# Patient Record
Sex: FEMALE | Race: BLACK OR AFRICAN AMERICAN | NOT HISPANIC OR LATINO | URBAN - METROPOLITAN AREA
[De-identification: names, ages, dates, MRNs, and addresses within clinical notes are randomized per-mention and may not be internally consistent; named-entity substitution may affect disease eponyms.]

---

## 2017-01-27 ENCOUNTER — IMPORTED ENCOUNTER (OUTPATIENT)
Dept: URBAN - METROPOLITAN AREA CLINIC 1 | Facility: CLINIC | Age: 52
End: 2017-01-27

## 2017-01-27 PROBLEM — Z96.1: Noted: 2017-01-27

## 2017-01-27 PROBLEM — H25.12: Noted: 2017-01-27

## 2017-01-27 PROBLEM — H40.013: Noted: 2017-01-27

## 2017-01-27 PROBLEM — H20.811: Noted: 2017-01-27

## 2017-01-27 PROCEDURE — 92133 CPTRZD OPH DX IMG PST SGM ON: CPT

## 2017-01-27 PROCEDURE — 92014 COMPRE OPH EXAM EST PT 1/>: CPT

## 2017-01-27 NOTE — PATIENT DISCUSSION
1.  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 2.  Glaucoma Suspect OU (0.2 OU) OCT shows no progression. Secondary to Fuch's Heterochromitis Iridocyclitis OD: C/D and IOP stable OU Past w/u (-). 3.  SHANEKA w/ PEK OU- Cont ATs TID OU routinely 4. Cataract OS: Observe for now without intervention. The patient was advised to contact us if any change or worsening of vision5. Pseudophakia OD- stable. Letter to PCP Return for an appointment in 6 mo 10 with Dr. Sivakumar Olivas.

## 2017-04-12 NOTE — PATIENT DISCUSSION
(H35.036) Drusen (degenerative) of macula, bilateral - Assesment : Examination revealed Drusen OU. - Plan : Monitor for changes. Advised patient to call our office with decreased vision or increased symptoms.

## 2017-04-12 NOTE — PATIENT DISCUSSION
(H35.371) Puckering of macula, right eye - Assesment : Examination revealed ERM OD - mild - Plan : Monitor. Advised patient to call our office with decreased vision or increased symptoms.

## 2017-04-12 NOTE — PATIENT DISCUSSION
(H25.013) Cortical age-related cataract, bilateral - Assesment : Examination revealed Cortical Senile Cataract. Patient is currently asymptomatic and functioning well. - Plan : Monitor for changes. Advised patient to call our office with decreased vision or increased symptoms.

## 2017-04-12 NOTE — PATIENT DISCUSSION
(N43.490) Keratoconjunct sicca, not specified as Sjogren's, bilateral - Assesment : Examination revealed Dry Eye Syndrome OU. Good lid closure OU. - Plan : Recommend PF artificial tears 3-4 times daily OU. Monitor for changes. Advised patient to call our office with decreased vision or increased symptoms.  RV 1 year Exam.

## 2017-07-28 ENCOUNTER — IMPORTED ENCOUNTER (OUTPATIENT)
Dept: URBAN - METROPOLITAN AREA CLINIC 1 | Facility: CLINIC | Age: 52
End: 2017-07-28

## 2017-07-28 PROBLEM — H20.811: Noted: 2017-07-28

## 2017-07-28 PROCEDURE — 99213 OFFICE O/P EST LOW 20 MIN: CPT

## 2017-07-28 NOTE — PATIENT DISCUSSION
1.  Fuch's Heterochromitis Iridocyclitis OD: Still remains quiet on Durezol OD QDaily. CPM.  2.  Glaucoma Suspect OU (0.2 OU) OCT shows no progression. Secondary to Fuch's Heterochromitis Iridocyclitis OD: C/D and IOP stable OU Past w/u (-). 3.  SHANEKA w/ improved PEK OU- Cont ATs TID OU routinely 4. Cataract OS: Observe for now without intervention. The patient was advised to contact us if any change or worsening of vision5. Pseudophakia OD- stable. Return for an appointment in 6 mo 30 OCT with Dr. Cristine Nj.

## 2018-03-30 ENCOUNTER — IMPORTED ENCOUNTER (OUTPATIENT)
Dept: URBAN - METROPOLITAN AREA CLINIC 1 | Facility: CLINIC | Age: 53
End: 2018-03-30

## 2018-03-30 PROBLEM — Z96.1: Noted: 2018-03-30

## 2018-03-30 PROBLEM — H16.143: Noted: 2018-03-30

## 2018-03-30 PROBLEM — H25.12: Noted: 2018-03-30

## 2018-03-30 PROBLEM — H40.023: Noted: 2018-03-30

## 2018-03-30 PROBLEM — H04.123: Noted: 2018-03-30

## 2018-03-30 PROCEDURE — 92133 CPTRZD OPH DX IMG PST SGM ON: CPT

## 2018-03-30 PROCEDURE — 92014 COMPRE OPH EXAM EST PT 1/>: CPT

## 2018-03-30 NOTE — PATIENT DISCUSSION
1.  Fuch's Heterochromitis Iridocyclitis OD: Still remains quiet on Durezol OD every other day2. Glaucoma Suspect OU : (.3 OD/ .2 OS) OCT wnl OU. Patient is considered high risk. Condition was discussed with patient and patient understands. Will continue to monitor patient for any progression in condition. Patient was advised to call us with any problems questions or concerns. 3.  SHANEKA w/ PEK OU-The continuation of artificial tears were recommended. 4.  Pseudophakia OD - standard5. Cataract OS: Observe for now without intervention. The patient was advised to contact us if any change or worsening of vision6. Return for an appointment for 6mo/10 with Dr. Matthieu Guevara.

## 2018-06-12 NOTE — PATIENT DISCUSSION
(C79.381) Dermatochalasis of right upper eyelid - Assesment : Examination revealed Dermatochalasis - Plan : Monitor for changes. Advised patient to call our office with decreased vision or increased symptoms.

## 2018-06-12 NOTE — PATIENT DISCUSSION
(O35.619) Dermatochalasis of left upper eyelid - Assesment : Examination revealed Dermatochalasis - Plan : Monitor for changes. Advised patient to call our office with decreased vision or increased symptoms.

## 2018-06-12 NOTE — PATIENT DISCUSSION
(H25.13) Age-related nuclear cataract, bilateral - Assesment : Examination revealed moderate senile nuclear cataract. Patient is currently asymptomatic and functioning well. - Plan : Monitor for changes. Advised patient to call our office with decreased vision or increased symptoms.

## 2018-06-12 NOTE — PATIENT DISCUSSION
(H35.363) Drusen (degenerative) of macula, bilateral - Assesment : Examination revealed Drusen OU. - Plan : Monitor for changes. Advised patient to call our office with decreased vision or increased symptoms.   RTC 1 year/EXAM/OCT mac

## 2018-10-05 ENCOUNTER — IMPORTED ENCOUNTER (OUTPATIENT)
Dept: URBAN - METROPOLITAN AREA CLINIC 1 | Facility: CLINIC | Age: 53
End: 2018-10-05

## 2018-10-05 PROBLEM — H20.811: Noted: 2018-10-05

## 2018-10-05 PROCEDURE — 92012 INTRM OPH EXAM EST PATIENT: CPT

## 2018-10-05 NOTE — PATIENT DISCUSSION
1.  Fuch's Heterochromitis Iridocyclitis OD -- Increased AC reaction w/ non compliance on Durezol OD. Was taking Durezol only once a month. Restart Durezol OD QOD (Sample given & ERx'd). Instilled 1 drop of Durezol now OD. 2.  Glaucoma Suspect OU (CD: 0.30 / 0.20) -- IOP stable today at 13 OU. Negative family h/o Glaucoma. AA. Patient is considered high risk. Condition was discussed with patient and patient understands. Will continue to monitor patient for any progression in condition. Patient was advised to call us with any problems questions or concerns. 3.  SHANEKA w/ PEK OU -- Recommend the frequent use of OTC AT's BID-QID OU. 4.  Pseudophakia OD (Standard) -- Doing well 5. Cataract OS -- Observe for now without intervention. The patient was advised to contact us if any change or worsening of vision. Return for an appointment in 3 MO for a 10 OU with Dr. Matthieu Guevara.

## 2019-01-11 ENCOUNTER — IMPORTED ENCOUNTER (OUTPATIENT)
Dept: URBAN - METROPOLITAN AREA CLINIC 1 | Facility: CLINIC | Age: 54
End: 2019-01-11

## 2019-01-11 PROBLEM — H20.811: Noted: 2019-01-11

## 2019-01-11 PROCEDURE — 92012 INTRM OPH EXAM EST PATIENT: CPT

## 2019-01-11 NOTE — PATIENT DISCUSSION
1.  Fuch's Heterochromic Iridocyclitis OD -- improved from last visit. Continue Durezol QOD OD (Sample rebate card & Rx given) 2. Glaucoma Suspect OU (CD: 0.30 / 0.20) -- AA. Neg Fm Hx. IOP stable on no gtts. Cont to observe. 3.  SHANEKA w/ PEK OU -- Cont ATs BID OU routinely. 4.  Pseudophakia OD (Standard)  5. Cataract OS -- ObserveReturn for an appointment in 4 mo 30 OCT with Dr. Rodo Brenstein.

## 2019-05-10 ENCOUNTER — IMPORTED ENCOUNTER (OUTPATIENT)
Dept: URBAN - METROPOLITAN AREA CLINIC 1 | Facility: CLINIC | Age: 54
End: 2019-05-10

## 2019-05-10 PROBLEM — H20.811: Noted: 2019-05-10

## 2019-05-10 PROBLEM — H40.013: Noted: 2019-05-10

## 2019-05-10 PROCEDURE — 92014 COMPRE OPH EXAM EST PT 1/>: CPT

## 2019-05-10 PROCEDURE — 92133 CPTRZD OPH DX IMG PST SGM ON: CPT

## 2019-05-10 NOTE — PATIENT DISCUSSION
1.  Fuch's Heterochromic Iridocyclitis OD -- increase in cells noted OD. Increase Durezol tp QDaily OD. Refill sent to Pharmacy rebate card given. 2.  Glaucoma Suspect OU (CD: 0.30 / 0.20) AA. Neg Fm Hx. IOP stable on no gtts. OCT WNL OU today. Cont to observe. 3.  SHANEKA w/ PEK OU -- Cont ATs BID OU routinely. 4.  Pseudophakia OD (Standard)  5. Cataract OS -- ObserveLetter to PCP MRx deferred by patientReturn for an appointment in 3 months 10 with Dr. Camilla Shelton.

## 2019-07-11 NOTE — PATIENT DISCUSSION
(H16.142) Punctate keratitis, left eye - Assesment : Examination revealed superficial punctate keratitis.  - Plan : Monitor

## 2019-07-11 NOTE — PATIENT DISCUSSION
(H35.363) Drusen (degenerative) of macula, bilateral - Assesment : Examination revealed Drusen OU. OCT todays suggets OD-few fine  drusen,OS- rare drusen - Plan : Monitor for changes. Advised patient to call our office with decreased vision or increased symptoms.   RTC 1 year/EXAM/OCT mac

## 2019-08-09 ENCOUNTER — IMPORTED ENCOUNTER (OUTPATIENT)
Dept: URBAN - METROPOLITAN AREA CLINIC 1 | Facility: CLINIC | Age: 54
End: 2019-08-09

## 2019-08-09 PROBLEM — H16.143: Noted: 2019-08-09

## 2019-08-09 PROBLEM — H04.123: Noted: 2019-08-09

## 2019-08-09 PROBLEM — H40.013: Noted: 2019-08-09

## 2019-08-09 PROCEDURE — 92012 INTRM OPH EXAM EST PATIENT: CPT

## 2019-08-09 NOTE — PATIENT DISCUSSION
1.  Fuch's Heterochromic Iridocyclitis OD -- slight improvement in cells noted OD. Continue Durezol to [employer] OD. Refill sent to Pharmacy rebate card given. 2.  Glaucoma Suspect OU (CD: 0.30 / 0.20) AA. Neg Fm Hx. IOP stable on no gtts. OCT WNL OU today. Cont to observe. 3.  SHANEKA w/ PEK OU -- Cont ATs BID OU routinely. 4.  Pseudophakia OD (Standard)  5. Cataract OS -- ObserveReturn for an appointment in 4 months 10 with Dr. Jessy Servin.

## 2020-05-07 ENCOUNTER — IMPORTED ENCOUNTER (OUTPATIENT)
Dept: URBAN - METROPOLITAN AREA CLINIC 1 | Facility: CLINIC | Age: 55
End: 2020-05-07

## 2020-05-07 PROBLEM — H20.811: Noted: 2020-05-07

## 2020-05-07 PROBLEM — H43.811: Noted: 2020-05-07

## 2020-05-07 PROBLEM — H16.143: Noted: 2020-05-07

## 2020-05-07 PROBLEM — H25.812: Noted: 2020-05-07

## 2020-05-07 PROBLEM — Z96.1: Noted: 2020-05-07

## 2020-05-07 PROBLEM — H40.013: Noted: 2020-05-07

## 2020-05-07 PROBLEM — H26.491: Noted: 2020-05-07

## 2020-05-07 PROBLEM — H04.123: Noted: 2020-05-07

## 2020-05-07 PROCEDURE — 92014 COMPRE OPH EXAM EST PT 1/>: CPT

## 2020-05-07 PROCEDURE — 92015 DETERMINE REFRACTIVE STATE: CPT

## 2020-05-07 NOTE — PATIENT DISCUSSION
1.  Fuch's Heterochromic Iridocyclitis OD - AC Quiet. Cont Durezol QD OD (erx). 2.  Glaucoma Suspect OU CD 0.30/0.20): IOP stable. Negative family hx. AA. Patient is considered Low Risk. Condition was discussed with patient and patient understands. Will continue to monitor patient for any progression in condition. Patient was advised to call us with any problems questions or concerns. 3.  SHANEKA w/ PEK OU- Recommend ATs TID OU routinely 4. Cataract OS: Observe for now without intervention. The patient was advised to contact us if any change or worsening of vision5. PCO  OD (Posterior Capsule Opacification)  Observe and consider yag cap when pt feels pco visually significant and visual acuity decreases to appropriate level. 6. Pseudophakia OD - (Standard OD) 7. PVD w/o Tear OD - RD precautions. MRX for glasses given. Return for an appointment in 4 months 10 with Dr. Claudeen Cobble.

## 2020-11-06 ENCOUNTER — IMPORTED ENCOUNTER (OUTPATIENT)
Dept: URBAN - METROPOLITAN AREA CLINIC 1 | Facility: CLINIC | Age: 55
End: 2020-11-06

## 2020-11-06 PROCEDURE — 92012 INTRM OPH EXAM EST PATIENT: CPT

## 2020-11-06 NOTE — PATIENT DISCUSSION
1.  Fuch's Heterochromic Iridocyclitis OD - AC remains Quiet. Cont Durezol QD OD (erx). 2.  Glaucoma Suspect OU CD 0.30/0.20): IOP stable. Negative family hx. AA. Patient is considered Low Risk. Condition was discussed with patient and patient understands. Will continue to monitor patient for any progression in condition. Patient was advised to call us with any problems questions or concerns. 3.  SHANEKA w/ PEK OU- Recommend ATs TID OU routinely 4. Cataract OS: Observe for now without intervention. The patient was advised to contact us if any change or worsening of vision5. PCO  OD (Posterior Capsule Opacification)  Observe and consider yag cap when pt feels pco visually significant and visual acuity decreases to appropriate level. 6. Pseudophakia OD - (Standard OD) 7. PVD w/o Tear OD - RD precautions. MRX for glasses given. Return for an appointment in May for a 30/glare OD no testing with Dr. Lorena Cronin.

## 2021-05-07 ENCOUNTER — IMPORTED ENCOUNTER (OUTPATIENT)
Dept: URBAN - METROPOLITAN AREA CLINIC 1 | Facility: CLINIC | Age: 56
End: 2021-05-07

## 2021-05-07 PROBLEM — H20.811: Noted: 2021-05-07

## 2021-05-07 PROCEDURE — 92014 COMPRE OPH EXAM EST PT 1/>: CPT

## 2021-05-07 NOTE — PATIENT DISCUSSION
1.  Fuch's Heterochromic Iridocyclitis OD -- AC remains Quiet. Cont Durezol QD OD. 2. Glaucoma Suspect OU (CD 0.30/0.20) -- IOP stable. Negative family hx. AA. Patient is considered Low Risk. Condition was discussed with patient and patient understands. Will continue to monitor patient for any progression in condition. Patient was advised to call us with any problems questions or concerns. 3.  SHANEKA w/ PEK OU -- Recommend ATs TID OU routinely 4. Cataract OS -- Observe for now without intervention. The patient was advised to contact us if any change or worsening of vision5. PCO OD (Posterior Capsule Opacification)  Observe and consider yag cap when pt feels pco visually significant and visual acuity decreases to appropriate level. 6. Pseudophakia OD -- (Standard OD) 7. PVD w/o Tear OD -- RD precautions. Return for an appointment 6 months for a 10 Dr. Helen Ledbetter.

## 2022-03-22 ENCOUNTER — FOLLOW UP (OUTPATIENT)
Dept: URBAN - METROPOLITAN AREA CLINIC 1 | Facility: CLINIC | Age: 57
End: 2022-03-22

## 2022-03-22 DIAGNOSIS — H20.811: ICD-10-CM

## 2022-03-22 PROCEDURE — 92015 DETERMINE REFRACTIVE STATE: CPT

## 2022-03-22 PROCEDURE — 92012 INTRM OPH EXAM EST PATIENT: CPT

## 2022-03-22 ASSESSMENT — VISUAL ACUITY
OD_CC: 20/20
OS_CC: 20/20

## 2022-03-22 ASSESSMENT — TONOMETRY
OD_IOP_MMHG: 14
OS_IOP_MMHG: 12

## 2022-03-22 NOTE — PATIENT DISCUSSION
(CD 0.30/0.20) IOP stable. Patient is considered low risk. Condition was discussed with patient and patient understands. Will continue to monitor patient for any progression in condition. Patient was advised to call us with any problems, questions, or concerns.

## 2022-04-08 ASSESSMENT — TONOMETRY
OS_IOP_MMHG: 13
OS_IOP_MMHG: 15
OS_IOP_MMHG: 12
OD_IOP_MMHG: 12
OS_IOP_MMHG: 14
OS_IOP_MMHG: 16
OD_IOP_MMHG: 12
OS_IOP_MMHG: 14
OS_IOP_MMHG: 13
OD_IOP_MMHG: 14
OD_IOP_MMHG: 18
OD_IOP_MMHG: 13
OD_IOP_MMHG: 15
OD_IOP_MMHG: 14
OD_IOP_MMHG: 18
OS_IOP_MMHG: 14
OD_IOP_MMHG: 15
OS_IOP_MMHG: 11
OS_IOP_MMHG: 12
OD_IOP_MMHG: 14

## 2022-04-08 ASSESSMENT — VISUAL ACUITY
OD_CC: 20/25
OD_CC: 20/20-1
OS_CC: 20/20
OD_GLARE: 20/50
OD_CC: 20/25
OD_CC: 20/20-1
OS_CC: 20/20
OS_SC: 20/20
OD_GLARE: 20/60
OD_CC: 20/25
OS_CC: 20/20
OS_CC: 20/20
OS_CC: 20/20-2
OD_CC: 20/25
OS_CC: 20/20-2
OD_CC: 20/20-1
OD_CC: 20/30
OS_GLARE: 20/50
OS_CC: 20/20
OS_GLARE: 20/60
OS_CC: 20/20
OS_CC: 20/20-1
OD_CC: 20/25+2
OD_SC: 20/20

## 2023-01-27 ENCOUNTER — COMPREHENSIVE EXAM (OUTPATIENT)
Dept: URBAN - METROPOLITAN AREA CLINIC 1 | Facility: CLINIC | Age: 58
End: 2023-01-27

## 2023-01-27 DIAGNOSIS — H04.123: ICD-10-CM

## 2023-01-27 DIAGNOSIS — H26.491: ICD-10-CM

## 2023-01-27 DIAGNOSIS — H16.143: ICD-10-CM

## 2023-01-27 DIAGNOSIS — H43.811: ICD-10-CM

## 2023-01-27 DIAGNOSIS — H40.013: ICD-10-CM

## 2023-01-27 DIAGNOSIS — H25.812: ICD-10-CM

## 2023-01-27 DIAGNOSIS — Z96.1: ICD-10-CM

## 2023-01-27 DIAGNOSIS — H20.811: ICD-10-CM

## 2023-01-27 PROCEDURE — 92014 COMPRE OPH EXAM EST PT 1/>: CPT

## 2023-01-27 PROCEDURE — 92015 DETERMINE REFRACTIVE STATE: CPT

## 2023-01-27 ASSESSMENT — VISUAL ACUITY
OS_BAT: 20/50
OD_SC: 20/40
OS_SC: 20/30
OD_BAT: 20/60

## 2023-01-27 ASSESSMENT — KERATOMETRY
OD_K2POWER_DIOPTERS: 45.25
OS_K1POWER_DIOPTERS: 44.00
OD_AXISANGLE_DEGREES: 007
OD_AXISANGLE2_DEGREES: 97
OS_K2POWER_DIOPTERS: 44.25
OS_AXISANGLE_DEGREES: 001
OD_K1POWER_DIOPTERS: 43.75
OS_AXISANGLE2_DEGREES: 91

## 2023-01-27 ASSESSMENT — TONOMETRY
OS_IOP_MMHG: 12
OD_IOP_MMHG: 13

## 2023-09-01 ENCOUNTER — FOLLOW UP (OUTPATIENT)
Dept: URBAN - METROPOLITAN AREA CLINIC 1 | Facility: CLINIC | Age: 58
End: 2023-09-01

## 2023-09-01 DIAGNOSIS — H20.811: ICD-10-CM

## 2023-09-01 PROCEDURE — 99213 OFFICE O/P EST LOW 20 MIN: CPT

## 2023-09-01 ASSESSMENT — TONOMETRY
OD_IOP_MMHG: 15
OS_IOP_MMHG: 16

## 2023-09-01 ASSESSMENT — VISUAL ACUITY
OD_CC: J1
OD_CC: 20/25
OS_CC: J1
OS_CC: 20/25

## 2024-11-08 ENCOUNTER — COMPREHENSIVE EXAM (OUTPATIENT)
Dept: URBAN - METROPOLITAN AREA CLINIC 1 | Facility: CLINIC | Age: 59
End: 2024-11-08

## 2024-11-08 ENCOUNTER — PREPPED CHART (OUTPATIENT)
Dept: URBAN - METROPOLITAN AREA CLINIC 1 | Facility: CLINIC | Age: 59
End: 2024-11-08

## 2024-11-08 DIAGNOSIS — Z96.1: ICD-10-CM

## 2024-11-08 DIAGNOSIS — H20.811: ICD-10-CM

## 2024-11-08 DIAGNOSIS — H25.812: ICD-10-CM

## 2024-11-08 DIAGNOSIS — H26.491: ICD-10-CM

## 2024-11-08 DIAGNOSIS — H16.143: ICD-10-CM

## 2024-11-08 DIAGNOSIS — H04.123: ICD-10-CM

## 2024-11-08 DIAGNOSIS — H40.013: ICD-10-CM

## 2024-11-08 PROCEDURE — 92015 DETERMINE REFRACTIVE STATE: CPT

## 2024-11-08 PROCEDURE — 92014 COMPRE OPH EXAM EST PT 1/>: CPT

## 2025-05-09 ENCOUNTER — FOLLOW UP (OUTPATIENT)
Age: 60
End: 2025-05-09

## 2025-05-09 DIAGNOSIS — H25.812: ICD-10-CM

## 2025-05-09 DIAGNOSIS — H40.013: ICD-10-CM

## 2025-05-09 DIAGNOSIS — H20.811: ICD-10-CM

## 2025-05-09 DIAGNOSIS — H26.491: ICD-10-CM

## 2025-05-09 PROCEDURE — 92012 INTRM OPH EXAM EST PATIENT: CPT

## 2025-05-09 PROCEDURE — 92015 DETERMINE REFRACTIVE STATE: CPT
